# Patient Record
Sex: FEMALE | NOT HISPANIC OR LATINO | URBAN - METROPOLITAN AREA
[De-identification: names, ages, dates, MRNs, and addresses within clinical notes are randomized per-mention and may not be internally consistent; named-entity substitution may affect disease eponyms.]

---

## 2022-08-15 ENCOUNTER — OUTPATIENT (OUTPATIENT)
Dept: OUTPATIENT SERVICES | Facility: HOSPITAL | Age: 42
LOS: 1 days | End: 2022-08-15

## 2022-08-15 DIAGNOSIS — Z11.52 ENCOUNTER FOR SCREENING FOR COVID-19: ICD-10-CM

## 2023-11-03 ENCOUNTER — LABORATORY RESULT (OUTPATIENT)
Age: 43
End: 2023-11-03

## 2023-11-03 ENCOUNTER — APPOINTMENT (OUTPATIENT)
Dept: RHEUMATOLOGY | Facility: CLINIC | Age: 43
End: 2023-11-03
Payer: COMMERCIAL

## 2023-11-03 VITALS
HEART RATE: 85 BPM | OXYGEN SATURATION: 96 % | WEIGHT: 187 LBS | SYSTOLIC BLOOD PRESSURE: 103 MMHG | RESPIRATION RATE: 16 BRPM | TEMPERATURE: 97.9 F | DIASTOLIC BLOOD PRESSURE: 67 MMHG | BODY MASS INDEX: 36.71 KG/M2 | HEIGHT: 60 IN

## 2023-11-03 DIAGNOSIS — M79.7 FIBROMYALGIA: ICD-10-CM

## 2023-11-03 DIAGNOSIS — Z87.19 PERSONAL HISTORY OF OTHER DISEASES OF THE DIGESTIVE SYSTEM: ICD-10-CM

## 2023-11-03 PROCEDURE — 36415 COLL VENOUS BLD VENIPUNCTURE: CPT

## 2023-11-03 PROCEDURE — 99205 OFFICE O/P NEW HI 60 MIN: CPT | Mod: 25

## 2023-11-03 RX ORDER — TRAZODONE HYDROCHLORIDE 100 MG/1
100 TABLET ORAL
Refills: 0 | Status: ACTIVE | COMMUNITY

## 2023-11-03 RX ORDER — ZINC SULFATE 50(220)MG
CAPSULE ORAL
Refills: 0 | Status: ACTIVE | COMMUNITY

## 2023-11-03 RX ORDER — GABAPENTIN 800 MG/1
800 TABLET, FILM COATED ORAL 3 TIMES DAILY
Refills: 0 | Status: ACTIVE | COMMUNITY

## 2023-11-03 RX ORDER — BUPRENORPHINE HYDROCHLORIDE, NALOXONE HYDROCHLORIDE 8; 2 MG/1; MG/1
8-2 FILM, SOLUBLE BUCCAL; SUBLINGUAL
Refills: 0 | Status: ACTIVE | COMMUNITY

## 2023-11-03 RX ORDER — ALPRAZOLAM 1 MG/1
1 TABLET ORAL AS DIRECTED
Refills: 0 | Status: ACTIVE | COMMUNITY

## 2023-11-04 LAB
ALBUMIN SERPL ELPH-MCNC: 4.5 G/DL
ALP BLD-CCNC: 154 U/L
ALT SERPL-CCNC: 35 U/L
ANION GAP SERPL CALC-SCNC: 8 MMOL/L
AST SERPL-CCNC: 41 U/L
BILIRUB SERPL-MCNC: 0.2 MG/DL
BUN SERPL-MCNC: 11 MG/DL
CALCIUM SERPL-MCNC: 9 MG/DL
CHLORIDE SERPL-SCNC: 102 MMOL/L
CO2 SERPL-SCNC: 29 MMOL/L
CREAT SERPL-MCNC: 0.79 MG/DL
CRP SERPL-MCNC: <3 MG/L
EGFR: 95 ML/MIN/1.73M2
ERYTHROCYTE [SEDIMENTATION RATE] IN BLOOD BY WESTERGREN METHOD: 17 MM/HR
GLUCOSE SERPL-MCNC: 114 MG/DL
HBV CORE IGG+IGM SER QL: NONREACTIVE
HBV SURFACE AB SER QL: NONREACTIVE
HBV SURFACE AG SER QL: NONREACTIVE
HCT VFR BLD CALC: 39 %
HCV AB SER QL: NONREACTIVE
HCV S/CO RATIO: 0.1 S/CO
HGB BLD-MCNC: 12.9 G/DL
MCHC RBC-ENTMCNC: 32.7 PG
MCHC RBC-ENTMCNC: 33.1 GM/DL
MCV RBC AUTO: 99 FL
PLATELET # BLD AUTO: 312 K/UL
POTASSIUM SERPL-SCNC: 4.9 MMOL/L
PROT SERPL-MCNC: 6.9 G/DL
RBC # BLD: 3.94 M/UL
RBC # FLD: 12.5 %
RHEUMATOID FACT SER QL: 11 IU/ML
SODIUM SERPL-SCNC: 139 MMOL/L
WBC # FLD AUTO: 4.21 K/UL

## 2023-11-05 LAB
CCP AB SER IA-ACNC: <8 UNITS
ENA RNP AB SER IA-ACNC: 0.2 AL
ENA SM AB SER IA-ACNC: <0.2 AL
ENA SS-A AB SER IA-ACNC: 0.2 AL
ENA SS-B AB SER IA-ACNC: <0.2 AL
RF+CCP IGG SER-IMP: NEGATIVE

## 2023-11-06 LAB
ACE BLD-CCNC: 24 U/L
ALBUMIN MFR SERPL ELPH: 61.4 %
ALBUMIN SERPL-MCNC: 4.2 G/DL
ALBUMIN/GLOB SERPL: 1.6 RATIO
ALPHA1 GLOB MFR SERPL ELPH: 4.2 %
ALPHA1 GLOB SERPL ELPH-MCNC: 0.3 G/DL
ALPHA2 GLOB MFR SERPL ELPH: 9.6 %
ALPHA2 GLOB SERPL ELPH-MCNC: 0.7 G/DL
B-GLOBULIN MFR SERPL ELPH: 10 %
B-GLOBULIN SERPL ELPH-MCNC: 0.7 G/DL
C3 SERPL-MCNC: 128 MG/DL
C4 SERPL-MCNC: 22 MG/DL
GAMMA GLOB FLD ELPH-MCNC: 1 G/DL
GAMMA GLOB MFR SERPL ELPH: 14.8 %
INTERPRETATION SERPL IEP-IMP: NORMAL
PROT SERPL-MCNC: 6.8 G/DL
PROT SERPL-MCNC: 6.8 G/DL

## 2023-11-07 LAB
ANA SER IF-ACNC: NEGATIVE
DSDNA AB SER-ACNC: <12 IU/ML
M TB IFN-G BLD-IMP: NEGATIVE
QUANTIFERON TB PLUS MITOGEN MINUS NIL: >10 IU/ML
QUANTIFERON TB PLUS NIL: 0.01 IU/ML
QUANTIFERON TB PLUS TB1 MINUS NIL: 0.01 IU/ML
QUANTIFERON TB PLUS TB2 MINUS NIL: 0.01 IU/ML
THYROGLOB AB SERPL-ACNC: <1 IU/ML
THYROPEROXIDASE AB SERPL IA-ACNC: 12 IU/ML

## 2023-11-10 LAB — 14-3-3 ETA AG SER IA-MCNC: <0.2 NG/ML

## 2024-01-11 ENCOUNTER — APPOINTMENT (OUTPATIENT)
Dept: RHEUMATOLOGY | Facility: CLINIC | Age: 44
End: 2024-01-11
Payer: COMMERCIAL

## 2024-01-11 VITALS
SYSTOLIC BLOOD PRESSURE: 135 MMHG | TEMPERATURE: 98.1 F | OXYGEN SATURATION: 95 % | HEART RATE: 58 BPM | DIASTOLIC BLOOD PRESSURE: 84 MMHG | WEIGHT: 182 LBS | HEIGHT: 60 IN | BODY MASS INDEX: 35.73 KG/M2

## 2024-01-11 DIAGNOSIS — R53.82 CHRONIC FATIGUE, UNSPECIFIED: ICD-10-CM

## 2024-01-11 DIAGNOSIS — R52 PAIN, UNSPECIFIED: ICD-10-CM

## 2024-01-11 DIAGNOSIS — M79.7 FIBROMYALGIA: ICD-10-CM

## 2024-01-11 PROCEDURE — 99214 OFFICE O/P EST MOD 30 MIN: CPT

## 2024-01-11 NOTE — PHYSICAL EXAM
[TextEntry] : GENERAL: Appears in no acute distress HEENT: EOMI, PERRLA. No conjunctival erythema. Moist mucous membranes. No nasopharyngeal ulcers NECK: Supple, no cervical lymphadenopathy, no thyromegaly CARDIOVASCULAR: RRR. S1, S2 auscultated. No murmurs or rubs. PULMONARY: Clear to auscultation b/l, no wheezes, rales, or crackles ABDOMINAL: Soft, nontender, nondistended. Bowel sounds present. No organomegaly. MSK: No active synovitis, swelling, erythema, or warmth. No deformities. Normal ROM of neck, back, b/l upper and lower extremities. SKIN: No lesions or rashes NEURO: No focal deficits PSYCH: AAOx3. Normal affect and thought process.

## 2024-01-11 NOTE — ASSESSMENT
[FreeTextEntry1] : 42 y/o female presents as follow up for abnormal labs.  Pt started to have diffuse pains in setting of gastric bypass with chronic vomiting.  Pt reports recurrent numbness, burning pains L>R legs all the way to the foot, R>L arms down to hands. Pt has Hx of C-spine spinal surgery 2017. Pt has osteoporosis, OA, spinal stenosis in back.  Pt reports chronic fatigue but also has insomnia. Recurrent headaches.  Pt has diagnosis of fibromyalgia by a rheumatologist 15 years ago, in setting of tender points, chronic fatigue, sensitivity to touch.  Pt was tried on Lyrica (mental changes), topical patches (did not work), Cymbalta for depression (stopped due to non-medical reason, but was not helping with joint pains)  Pt is on neurontin 800mg PO TID.  Pt has had sleep studies with sleep disturbances. Pt has iron deficiency anemia, getting iron infusions.  Pt referred to rheumatology for abnormal labs (?RNP) that pt does not know or sent to me.  Mother - ?RA   Workup by me with negative autoimmune workup including PAOLA, PAOLA subserologies, normal inflammatory markers.  XR C-spine showing surgical changes, mild OA. Pt's neuropathy symptoms are most likely related to spinal stenosis (which pt was told she had in L-spine in past), with some contribution of fibromyalgia that pt was diagnosed with in the past.   - Pt advised on follow up with neurology for further evaluation and treatment of her neuropathy symptoms and any neurologic testing for her symptoms  - Pt to continue gabapentin 800mg PO TID - Patient advised on stress reduction, sleep hygiene, physical activity and exercise (including yoga and Jani-Chi), treatment of mood disorders, IBS, cognitive behavioral therapy.  - RTC PRN

## 2024-01-11 NOTE — HISTORY OF PRESENT ILLNESS
[FreeTextEntry1] : HISTORY:  42 y/o female presents as follow up for abnormal labs.  Pt started to have diffuse pains in setting of gastric bypass with chronic vomiting.  Pt reports recurrent numbness, burning pains L>R legs all the way to the foot, R>L arms down to hands. Pt has Hx of C-spine spinal surgery 2017. Pt has osteoporosis, OA, spinal stenosis in back.  Pt reports chronic fatigue but also has insomnia. Recurrent headaches.  Pt has diagnosis of fibromyalgia by a rheumatologist 15 years ago, in setting of tender points, chronic fatigue, sensitivity to touch.  Pt was tried on Lyrica (mental changes), topical patches (did not work), Cymbalta for depression (stopped due to non-medical reason, but was not helping with joint pains)  Pt is on neurontin 800mg PO TID.  Pt has had sleep studies with sleep disturbances. Pt has iron deficiency anemia, getting iron infusions.  Pt referred to rheumatology for abnormal labs (?RNP) that pt does not know or sent to me.  Mother - ?RA   INTERVAL HISTORY:  Labwork without any signs of underlying autoimmune diseases. XR with mild C-spine OA, normal L-spine.  WORKUP:  Remarkable for (11/2023): AST 41, Alk Phos 154, ESR 17  Normal/neg (11/2023): CBC, CRP, PAOLA, dsDNA, SSA, SSB, Barreto, RNP, C3, C4, Thyroid Ab, RF, CCP, ANCAs, 14.3.3 eta protein, ACE level, SPEP, Lyme, Hep B/C, Quantiferon TB XR C-spine (1/2024): Anterior discectomy fusion. Degenerative changes C5-6, C6-7 XR L-spine (1/2024): Normal

## 2024-08-16 ENCOUNTER — NON-APPOINTMENT (OUTPATIENT)
Age: 44
End: 2024-08-16

## 2024-08-16 ENCOUNTER — LABORATORY RESULT (OUTPATIENT)
Age: 44
End: 2024-08-16

## 2024-08-16 ENCOUNTER — APPOINTMENT (OUTPATIENT)
Dept: FAMILY MEDICINE | Facility: CLINIC | Age: 44
End: 2024-08-16
Payer: COMMERCIAL

## 2024-08-16 VITALS
OXYGEN SATURATION: 94 % | HEART RATE: 100 BPM | TEMPERATURE: 97.8 F | BODY MASS INDEX: 34.95 KG/M2 | WEIGHT: 178 LBS | HEIGHT: 60 IN

## 2024-08-16 DIAGNOSIS — D64.9 ANEMIA, UNSPECIFIED: ICD-10-CM

## 2024-08-16 DIAGNOSIS — E66.01 MORBID (SEVERE) OBESITY DUE TO EXCESS CALORIES: ICD-10-CM

## 2024-08-16 DIAGNOSIS — M50.90 CERVICAL DISC DISORDER, UNSPECIFIED, UNSPECIFIED CERVICAL REGION: ICD-10-CM

## 2024-08-16 DIAGNOSIS — M79.7 FIBROMYALGIA: ICD-10-CM

## 2024-08-16 PROCEDURE — 99406 BEHAV CHNG SMOKING 3-10 MIN: CPT

## 2024-08-16 PROCEDURE — 99204 OFFICE O/P NEW MOD 45 MIN: CPT | Mod: 25

## 2024-08-16 PROCEDURE — 36415 COLL VENOUS BLD VENIPUNCTURE: CPT

## 2024-08-16 RX ORDER — TIRZEPATIDE 2.5 MG/.5ML
2.5 INJECTION, SOLUTION SUBCUTANEOUS
Qty: 1 | Refills: 0 | Status: ACTIVE | COMMUNITY
Start: 2024-08-16 | End: 1900-01-01

## 2024-08-16 NOTE — HEALTH RISK ASSESSMENT
[0] : 2) Feeling down, depressed, or hopeless: Not at all (0) [PHQ-2 Negative - No further assessment needed] : PHQ-2 Negative - No further assessment needed [[id number]] : 0

## 2024-08-16 NOTE — PLAN
[FreeTextEntry1] : Establishment of health care for 43-year-old female who presents to discuss weight loss modalities Tobacco use-she is a smoker converting to vaping tobacco counseling is offered she feels she wants to vape and then try to quit from there Fibromyalgia-chronic muscle aches Cervical disc disease-status post surgical intervention for disc disease with resultant radiculopathy Sleep disorder-uses trazodone on a regular basis Anemia-diagnosed with anemia no medications Lab work is drawn for evaluation Obesity-5 foot 176 pound female she underwent gastric bypass over a decade ago and is now starting to gain back weight over the last 2 years she feels she has gained about 50 pounds.  Lab work is drawn for thyroid evaluation Zepbound is offered on a weekly basis

## 2024-08-17 LAB
ALBUMIN SERPL ELPH-MCNC: 4.7 G/DL
ALP BLD-CCNC: 188 U/L
ALT SERPL-CCNC: 44 U/L
ANION GAP SERPL CALC-SCNC: 12 MMOL/L
AST SERPL-CCNC: 56 U/L
BASOPHILS # BLD AUTO: 0.03 K/UL
BASOPHILS NFR BLD AUTO: 0.2 %
BILIRUB SERPL-MCNC: 0.6 MG/DL
BUN SERPL-MCNC: 11 MG/DL
CALCIUM SERPL-MCNC: 9.4 MG/DL
CHLORIDE SERPL-SCNC: 97 MMOL/L
CHOLEST SERPL-MCNC: 199 MG/DL
CO2 SERPL-SCNC: 26 MMOL/L
CREAT SERPL-MCNC: 0.66 MG/DL
EGFR: 110 ML/MIN/1.73M2
EOSINOPHIL # BLD AUTO: 0 K/UL
EOSINOPHIL NFR BLD AUTO: 0 %
ESTIMATED AVERAGE GLUCOSE: 97 MG/DL
GLUCOSE SERPL-MCNC: 125 MG/DL
HBA1C MFR BLD HPLC: 5 %
HCT VFR BLD CALC: 43.6 %
HDLC SERPL-MCNC: 97 MG/DL
HGB BLD-MCNC: 13.4 G/DL
IMM GRANULOCYTES NFR BLD AUTO: 0.6 %
LDLC SERPL CALC-MCNC: 91 MG/DL
LYMPHOCYTES # BLD AUTO: 0.54 K/UL
LYMPHOCYTES NFR BLD AUTO: 3.9 %
MAN DIFF?: NORMAL
MCHC RBC-ENTMCNC: 30.7 GM/DL
MCHC RBC-ENTMCNC: 32.7 PG
MCV RBC AUTO: 106.3 FL
MONOCYTES # BLD AUTO: 0.31 K/UL
MONOCYTES NFR BLD AUTO: 2.3 %
NEUTROPHILS # BLD AUTO: 12.81 K/UL
NEUTROPHILS NFR BLD AUTO: 93 %
NONHDLC SERPL-MCNC: 102 MG/DL
PLATELET # BLD AUTO: 265 K/UL
POTASSIUM SERPL-SCNC: 4.4 MMOL/L
PROT SERPL-MCNC: 7.3 G/DL
RBC # BLD: 4.1 M/UL
RBC # FLD: 13.2 %
SODIUM SERPL-SCNC: 135 MMOL/L
TRIGL SERPL-MCNC: 57 MG/DL
TSH SERPL-ACNC: 0.88 UIU/ML
WBC # FLD AUTO: 13.77 K/UL

## 2024-09-04 ENCOUNTER — APPOINTMENT (OUTPATIENT)
Dept: RHEUMATOLOGY | Facility: CLINIC | Age: 44
End: 2024-09-04
Payer: COMMERCIAL

## 2024-09-04 VITALS — HEART RATE: 62 BPM | DIASTOLIC BLOOD PRESSURE: 83 MMHG | OXYGEN SATURATION: 96 % | SYSTOLIC BLOOD PRESSURE: 123 MMHG

## 2024-09-04 DIAGNOSIS — G62.89 OTHER SPECIFIED POLYNEUROPATHIES: ICD-10-CM

## 2024-09-04 DIAGNOSIS — R52 PAIN, UNSPECIFIED: ICD-10-CM

## 2024-09-04 PROCEDURE — 99214 OFFICE O/P EST MOD 30 MIN: CPT

## 2024-09-04 RX ORDER — TIZANIDINE 2 MG/1
2 TABLET ORAL EVERY 8 HOURS
Qty: 180 | Refills: 1 | Status: ACTIVE | COMMUNITY
Start: 2024-09-04 | End: 1900-01-01

## 2024-09-04 NOTE — PHYSICAL EXAM
[General Appearance - Well Nourished] : well nourished [General Appearance - Well Developed] : well developed [Sclera] : the sclera and conjunctiva were normal [Hearing Threshold Finger Rub Not [place]] : hearing was normal [Neck Appearance] : the appearance of the neck was normal [Auscultation Breath Sounds / Voice Sounds] : lungs were clear to auscultation bilaterally [Heart Sounds] : normal S1 and S2 [Nail Clubbing] : no clubbing  or cyanosis of the fingernails [Musculoskeletal - Swelling] : no joint swelling seen [Motor Tone] : muscle strength and tone were normal [FreeTextEntry1] : many fibromyalgia trigger points, no synovitis, FROM overall  [] : no rash [Skin Lesions] : no skin lesions [Affect] : the affect was normal [Mood] : the mood was normal

## 2024-09-04 NOTE — ASSESSMENT
[FreeTextEntry1] : 44 y/o woman with depression, OP, OA, spinal stenosis, cervical spondylosis (s/p surgery) presents for second evaluation of long-standing diffuse body pain.  She has unrevealing serologies and normal inflammatory markers   patient most likely with fibromyalgia   --patient is to start tizanidine  --cont gabapentin 800 TID  --start Jani chi excercise --see neurology given history of neuropathy  --check CPK/myoglobulin --check AVISE ( dry eye?)  --check SPEP and LDH  --xary  knees, hips and feet

## 2024-09-04 NOTE — HISTORY OF PRESENT ILLNESS
[FreeTextEntry1] : patient seen by Dr eaton 1/2024.  had extensive workup which has been unremarkable.   HISTORY:  44 y/o woman with depression, insomnia, hx of obesity ( s/p gastric bypass) cervical spondylosis ( s/p Anterior discectomy fusion), neuropathy of the feet ( dx many years ago per patient report) , presents for evaluation of diffuse body pain which has been progressive over many years.    Pt started to have diffuse pains in setting of gastric bypass with chronic vomiting.  Pt reports recurrent numbness, burning pains L>R legs all the way to the foot, R>L arms down to hands. Pt has Hx of C-spine spinal surgery 2017. Pt has osteoporosis, OA, spinal stenosis in back.  Pt reports chronic fatigue but also has insomnia. Recurrent headaches.  Pt has diagnosis of fibromyalgia by a rheumatologist 15 years ago, in setting of tender points, chronic fatigue, sensitivity to touch.  Pt was tried on Lyrica (mental changes), topical patches (did not work), Cymbalta for depression (stopped due to non-medical reason, but was not helping with joint pains)  Pt is on Neurontin 800mg PO TID.  Pt has had sleep studies with sleep disturbances but no sleep apnea.    she states that she had sepsis -- due to GI issues??  she has long standing left more than right leg swelling.  had dopplers and vascular eval which was normal in the past.   she has history of foot pain and history of neuropathy.  not seen by neuro in many years. in the past she was followed by pain management and was on oxycodone.  cannot recall last time she took this.  states that she will never go back to pain management.    denies any alopecia, oral lesions, persistent dry eye or dry mouth, red painful eye, nose bleeding, dysphagia, hoarseness, facial rashes, photosensitivity, sob, cough, cp, hx of serositis, hx low wbc, plts or anemia that required special treatment, Gi issues, Raynaud's, weakness, DVt/PE, miscarriages, preclampsia  denies psoriasis, nail changes, Sauage digits, tennis elbow, de Quervain, plantar fasciitis, Achilles pain, , FH of psoriiasis, UC or crohns  No hx of STds   INTERVAL HISTORY:  Labwork without any signs of underlying autoimmune diseases. XR with mild C-spine OA, normal L-spine.  WORKUP:  Remarkable for (11/2023): AST 41, Alk Phos 154, ESR 17  Normal/neg (11/2023): CBC, CRP, PAOLA, dsDNA, SSA, SSB, Barreto, RNP, C3, C4, Thyroid Ab, RF, CCP, ANCAs, 14.3.3 eta protein, ACE level, SPEP, Lyme, Hep B/C, Quantiferon TB XR C-spine (1/2024): Anterior discectomy fusion. Degenerative changes C5-6, C6-7 XR L-spine (1/2024): Normal

## 2024-11-06 ENCOUNTER — APPOINTMENT (OUTPATIENT)
Dept: RHEUMATOLOGY | Facility: CLINIC | Age: 44
End: 2024-11-06

## 2025-04-03 ENCOUNTER — NON-APPOINTMENT (OUTPATIENT)
Age: 45
End: 2025-04-03

## 2025-04-04 ENCOUNTER — APPOINTMENT (OUTPATIENT)
Dept: FAMILY MEDICINE | Facility: CLINIC | Age: 45
End: 2025-04-04
Payer: COMMERCIAL

## 2025-04-04 VITALS
SYSTOLIC BLOOD PRESSURE: 112 MMHG | BODY MASS INDEX: 26.9 KG/M2 | TEMPERATURE: 97.5 F | RESPIRATION RATE: 14 BRPM | WEIGHT: 137 LBS | HEART RATE: 81 BPM | HEIGHT: 60 IN | OXYGEN SATURATION: 96 % | DIASTOLIC BLOOD PRESSURE: 68 MMHG

## 2025-04-04 DIAGNOSIS — M79.7 FIBROMYALGIA: ICD-10-CM

## 2025-04-04 DIAGNOSIS — R53.82 CHRONIC FATIGUE, UNSPECIFIED: ICD-10-CM

## 2025-04-04 DIAGNOSIS — E66.01 MORBID (SEVERE) OBESITY DUE TO EXCESS CALORIES: ICD-10-CM

## 2025-04-04 DIAGNOSIS — D64.9 ANEMIA, UNSPECIFIED: ICD-10-CM

## 2025-04-04 PROCEDURE — 99214 OFFICE O/P EST MOD 30 MIN: CPT

## 2025-05-05 ENCOUNTER — RX RENEWAL (OUTPATIENT)
Age: 45
End: 2025-05-05

## 2025-06-02 ENCOUNTER — RX RENEWAL (OUTPATIENT)
Age: 45
End: 2025-06-02

## 2025-09-11 ENCOUNTER — RX RENEWAL (OUTPATIENT)
Age: 45
End: 2025-09-11